# Patient Record
Sex: FEMALE | URBAN - METROPOLITAN AREA
[De-identification: names, ages, dates, MRNs, and addresses within clinical notes are randomized per-mention and may not be internally consistent; named-entity substitution may affect disease eponyms.]

---

## 2024-08-05 ENCOUNTER — TELEPHONE (OUTPATIENT)
Dept: CARDIOLOGY | Facility: CLINIC | Age: 39
End: 2024-08-05
Payer: COMMERCIAL

## 2024-08-05 NOTE — TELEPHONE ENCOUNTER
Caller: Fatmata Haddad    Relationship to patient: Self    Best call back number: [phone]    Chief complaint: PT HAD TO CANCEL APPT DUE TO HAVING PROCEDURE FROM VASCULAR SURGEON THAT DOESN'T ALLOW HER TO BE ON HER FEET    Type of visit: NEW PATIENT     Requested date: AROUND END OF AUGUST     If rescheduling, when is the original appointment: 8/27/24     Additional notes:NO APPTS AVAILABLE UNTIL MAY OF 2025. PLEASE REACH OUT TO PATIENT TO SCHEDULE, THANK YOU.

## 2024-11-19 ENCOUNTER — TELEPHONE (OUTPATIENT)
Dept: CARDIOLOGY | Facility: CLINIC | Age: 39
End: 2024-11-19
Payer: COMMERCIAL

## 2024-11-19 NOTE — TELEPHONE ENCOUNTER
Caller: Fatmata Haddad    Relationship to patient: Self    Best call back number: [phone]     Chief complaint:  HEART PALPITATIONS, FAST HR     Type of visit:  NEW PATIENT    Requested date: ASAP       Additional notes: PT IS WILLING TO GO TO THE [facility] LOCATION IF POSSIBLE.

## 2024-11-20 NOTE — TELEPHONE ENCOUNTER
SW PT VIA PHONE SHE IS AWARE OF APPT DATE, TIME, LOCATION & WHO SHE WILL BE SEEING. I MAILED OUT THE APPT REMINDER PAPER.

## 2025-02-07 ENCOUNTER — OFFICE VISIT (OUTPATIENT)
Dept: CARDIOLOGY | Facility: CLINIC | Age: 40
End: 2025-02-07
Payer: COMMERCIAL

## 2025-02-07 VITALS
OXYGEN SATURATION: 100 % | HEIGHT: 64 IN | BODY MASS INDEX: 44.42 KG/M2 | SYSTOLIC BLOOD PRESSURE: 134 MMHG | WEIGHT: 260.2 LBS | HEART RATE: 73 BPM | DIASTOLIC BLOOD PRESSURE: 76 MMHG

## 2025-02-07 DIAGNOSIS — I49.3 PVC (PREMATURE VENTRICULAR CONTRACTION): ICD-10-CM

## 2025-02-07 DIAGNOSIS — I49.1 APC (ATRIAL PREMATURE CONTRACTIONS): Primary | ICD-10-CM

## 2025-02-07 DIAGNOSIS — E66.01 MORBID OBESITY WITH BMI OF 40.0-44.9, ADULT: ICD-10-CM

## 2025-02-07 PROCEDURE — 93000 ELECTROCARDIOGRAM COMPLETE: CPT | Performed by: INTERNAL MEDICINE

## 2025-02-07 PROCEDURE — 99244 OFF/OP CNSLTJ NEW/EST MOD 40: CPT | Performed by: INTERNAL MEDICINE

## 2025-02-07 RX ORDER — EPINEPHRINE 0.3 MG/.3ML
INJECTION SUBCUTANEOUS
COMMUNITY

## 2025-02-07 RX ORDER — CETIRIZINE HYDROCHLORIDE, PSEUDOEPHEDRINE HYDROCHLORIDE 5; 120 MG/1; MG/1
1 TABLET, FILM COATED, EXTENDED RELEASE ORAL AS NEEDED
COMMUNITY
Start: 2024-11-26

## 2025-02-07 RX ORDER — BUPRENORPHINE 8 MG/1
8 TABLET SUBLINGUAL 2 TIMES DAILY
COMMUNITY

## 2025-02-07 NOTE — PROGRESS NOTES
Electrophysiology Clinic Consult     Fatmata Haddad  1985  [unfilled]  [unfilled]    23    DATE OF ADMISSION: (Not on file)  [facility] CARDIOLOGY    Devora Gallego MD  [address] [address] / [facility] [address]  Referring Provider: Derick Grubbs MD     Chief Complaint   Patient presents with    ventricular premature depolarization      Problem List:  PVCs  Echocardiogram 1/3/2024: EF 60 to 65%, no regional wall abnormalities, normal diastolic function, mild pulmonary hypertension with RVSP 35 mmHg, mild to moderate TR  Zoll Event monitor : PVCs noted, PACs also noted.  No burden percentage noted  HTN  Morbid obesity  Iron deficiency anemia/microcytic anemia  Menorrhagia  Fatigue  Depression  Surgical history   x 3  Tendon repair  Ear infection  Right foot surgery      HPI 39-year-old white female referred for tachypalpitations/PACs/PVCs.  The patient states that she has had a longstanding history of the symptoms dating back to when she was in her 20 years of age group.  However over the past year or so, the episodes seem to be worsening.  Now they occur on a daily basis.  She also notes some sustained  fast rhythms that can last a few seconds to minutes.  No near-syncope or syncope.  Has never been treated with medical therapy for these problems.  She does not drink a consider amount of caffeine on a daily basis but recently went to half caffeine/caffeine free sodas.  Denies any tobacco use drug use or vapor use.  She describes her symptoms as being mild to moderate and is concerned that these arrhythmias may result in a stroke or myocardial infarction.  She has a history of hypertension although she states her blood pressures at home have been stable.  No other cardiovascular complaints at this point.  She also states that she has a lot of reflux and gas that resulted in making her PACs PVCs worse.    Allergies   Allergen Reactions    Sulfa  Antibiotics Rash    Vancomycin Unknown - Low Severity and Rash     vancomycin        Cannot display prior to admission medications because the patient has not been admitted in this contact.              Current Outpatient Medications:     buprenorphine (SUBUTEX) 8 MG sublingual tablet SL tablet, Place 1 tablet under the tongue 2 (Two) Times a Day., Disp: , Rfl:     cetirizine-pseudoephedrine (ZyrTEC-D) 5-120 MG per 12 hr tablet, Take 1 tablet by mouth As Needed., Disp: , Rfl:     EPINEPHrine (EPIPEN) 0.3 MG/0.3ML solution auto-injector injection, 1 (ONE) PEN INJECTED SQ INTO THIGH UPON SUSPECTED ANAPHYLACTIC RESPONSE TO ALLERGEN, Disp: , Rfl:     Social History     Socioeconomic History    Marital status: Unknown   Tobacco Use    Smoking status: Former     Types: Cigarettes    Tobacco comments:     Stopped in her 20's   Vaping Use    Vaping status: Never Used   Substance and Sexual Activity    Alcohol use: Not Currently    Drug use: Never    Sexual activity: Defer       Family History   Problem Relation Age of Onset    Hypertension Mother     COPD Mother     COPD Father     Hypertension Father     Atrial fibrillation Father     No Known Problems Sister     No Known Problems Sister     No Known Problems Sister        REVIEW OF SYSTEMS:   CONST:  No weight loss, fever, chills, weakness + fatigue.   HEENT:  No visual loss, blurred vision, double vision, yellow sclerae.                   No hearing loss, congestion, sore throat.   SKIN:      No rashes, urticaria, ulcers, sores.     RESP:     No shortness of breath, hemoptysis, cough, sputum.   GI:           No anorexia, nausea, vomiting, diarrhea. + abdominal pain, no melena.   :         No burning on urination, hematuria or increased frequency.  ENDO:    No diaphoresis, cold or heat intolerance. No polyuria or polydipsia.   NEURO:  No headache, dizziness, syncope, paralysis, ataxia, or parasthesias.                  No change in bowel or bladder control. No history  "of CVA/TIA  MUSC:    +muscle, back pain, joint pain, stiffness.   HEME:    + anemia,  no bleeding, bruising. No history of DVT/PE.  PSYCH:  No history of depression, anxiety    Vitals:    02/07/25 1507   BP: 134/76   BP Location: Right arm   Patient Position: Sitting   Pulse: 73   SpO2: 100%   Weight: 118 kg (260 lb 3.2 oz)   Height: 162.6 cm (64\")                 Physical Exam:  GEN: Well nourished, well-developed, no acute distress  HEENT: Normocephalic, atraumatic, PERRLA, moist mucous membranes  NECK: Supple, NO JVD, no thyromegaly, no lymphadenopathy  CARD: Regular rhythm normal S1-S2.  No S3-S4 murmurs.  LUNGS: Clear to auscultation, normal respiratory effort  ABDOMEN: Soft, nontender, normal bowel sounds  EXTREMITIES: No gross deformities, no clubbing, cyanosis,+ edema  SKIN: Warm, dry  NEURO: No focal deficits, alert and oriented x 3  PSYCHIATRIC: Normal affect and mood      I personally viewed and interpreted the patient's EKG/Telemetry/lab data    No results found for: \"GLUCOSE\", \"CALCIUM\", \"NA\", \"K\", \"CO2\", \"CL\", \"BUN\", \"CREATININE\", \"EGFRIFAFRI\", \"EGFRIFNONA\", \"BCR\", \"ANIONGAP\"  No results found for: \"WBC\", \"HGB\", \"HCT\", \"MCV\", \"PLT\"  No results found for: \"INR\", \"PROTIME\"  No results found for: \"TSH\", \"A2OFVMK\", \"D6NLDMC\", \"THYROIDAB\"             ECG 12 Lead    Date/Time: 2/7/2025 3:37 PM  Performed by: Hank Zhang MD    Authorized by: Hank Zhang MD  Comparison: compared with previous ECG from 6/25/2024  Similar to previous ECG  Rhythm: sinus rhythm  BPM: 64            ICD-10-CM ICD-9-CM   1. APC (atrial premature contractions)  I49.1 427.61   2. PVC (premature ventricular contraction)  I49.3 427.69   3. Morbid obesity with BMI of 40.0-44.9, adult  E66.01 278.01    Z68.41 V85.41       Assessment and Plan:     PVCs/PACs symptomatic in nature Long discussion with the patient today.  Did discuss all triggers that might be making her situation worse.  I also discussed the possibility of using " flecainide 50 mg p.o. twice daily for suppression.  After long discussion, the patient has decided to exercise, weight loss, and restrict caffeine in her diet.  If her PACs/PVCs do not improve, she is to notify us at which point we can always start the flecainide.  Of note would avoid beta-blockers in this patient.  Obesity: Patient is to pursue weight loss program.  Abdominal pain/GERD.  Will pursue 1 month Protonix 40 mg daily to see if this helps.